# Patient Record
Sex: MALE | Race: WHITE | NOT HISPANIC OR LATINO | URBAN - METROPOLITAN AREA
[De-identification: names, ages, dates, MRNs, and addresses within clinical notes are randomized per-mention and may not be internally consistent; named-entity substitution may affect disease eponyms.]

---

## 2024-05-24 ENCOUNTER — HOSPITAL ENCOUNTER (EMERGENCY)
Facility: HOSPITAL | Age: 24
Discharge: HOME | End: 2024-05-24
Attending: EMERGENCY MEDICINE | Admitting: EMERGENCY MEDICINE

## 2024-05-24 VITALS
DIASTOLIC BLOOD PRESSURE: 90 MMHG | TEMPERATURE: 98.4 F | SYSTOLIC BLOOD PRESSURE: 144 MMHG | OXYGEN SATURATION: 98 % | RESPIRATION RATE: 20 BRPM | HEART RATE: 87 BPM

## 2024-05-24 DIAGNOSIS — F10.929 ALCOHOL INTOXICATION (HCC): Primary | ICD-10-CM

## 2024-05-24 PROCEDURE — 99284 EMERGENCY DEPT VISIT MOD MDM: CPT

## 2024-05-24 PROCEDURE — 99282 EMERGENCY DEPT VISIT SF MDM: CPT | Performed by: EMERGENCY MEDICINE

## 2024-05-25 NOTE — ED PROVIDER NOTES
"History  Chief Complaint   Patient presents with    Alcohol Intoxication     Patient reports drinking vodka and taking shrooms and is \"having a bad trip\"      23 year old male presents stating \"I need you to give me drugs because I'm having a bad trip.\"  Patient states he used mushrooms and drank a bottle of Vodka 5 hours ago and feels \"like I'm in hell.\"  He reports anxiety and nausea.      Alcohol Intoxication      None       No past medical history on file.    No past surgical history on file.    No family history on file.  I have reviewed and agree with the history as documented.    No existing history information found.  No existing history information found.       Review of Systems    Physical Exam  Physical Exam  Vitals and nursing note reviewed.   HENT:      Head: Normocephalic and atraumatic.   Pulmonary:      Effort: Pulmonary effort is normal.   Musculoskeletal:         General: No deformity.   Skin:     General: Skin is warm and dry.   Neurological:      Mental Status: He is alert.      Gait: Gait normal.         Vital Signs  ED Triage Vitals [05/24/24 2212]   Temperature Pulse Respirations Blood Pressure SpO2   98.4 °F (36.9 °C) 87 20 144/90 98 %      Temp Source Heart Rate Source Patient Position - Orthostatic VS BP Location FiO2 (%)   Oral Monitor Sitting Left arm --      Pain Score       --           Vitals:    05/24/24 2212   BP: 144/90   Pulse: 87   Patient Position - Orthostatic VS: Sitting         Visual Acuity      ED Medications  Medications - No data to display    Diagnostic Studies  Results Reviewed       None                   No orders to display              Procedures  Procedures         ED Course                                             Medical Decision Making  23 year old male presents for evaluation of nausea and anxiety related to alcohol and mushroom use.  Patient demanding benadryl and something for anxiety.  He was lying face down in the stretcher and uncooperative with exam.  " Patient left the room after my evaluation.             Disposition  Final diagnoses:   Alcohol intoxication (HCC)     Time reflects when diagnosis was documented in both MDM as applicable and the Disposition within this note       Time User Action Codes Description Comment    5/24/2024 10:14 PM Tamiko Arceo Add [F10.929] Alcohol intoxication (HCC)           ED Disposition       ED Disposition   Left from Room after Provider Exam    Condition   --    Date/Time   Fri May 24, 2024 10:15 PM    Comment   --             Follow-up Information    None         Patient's Medications    No medications on file       No discharge procedures on file.    PDMP Review       None            ED Provider  Electronically Signed by             Tamiko Arceo MD  05/24/24 7508

## 2024-05-25 NOTE — ED NOTES
Pt was hostile & rude to staff attempting to complete pts triage. Pt requesting Benadryl; informed pt he must be seen by provider prior to medications being administered. Pt then proceeded to walk out room; A&Ox 4 & gait steady. Pt left before being seen by provider.      Joanna Gutierrez RN  05/24/24 0041